# Patient Record
Sex: MALE | Race: WHITE
[De-identification: names, ages, dates, MRNs, and addresses within clinical notes are randomized per-mention and may not be internally consistent; named-entity substitution may affect disease eponyms.]

---

## 2017-03-24 ENCOUNTER — HOSPITAL ENCOUNTER (INPATIENT)
Dept: HOSPITAL 82 - ED | Age: 72
LOS: 3 days | Discharge: HOME | DRG: 871 | End: 2017-03-27
Attending: INTERNAL MEDICINE | Admitting: INTERNAL MEDICINE
Payer: MEDICARE

## 2017-03-24 VITALS — BODY MASS INDEX: 26.93 KG/M2 | WEIGHT: 167.55 LBS | HEIGHT: 66 IN

## 2017-03-24 VITALS — SYSTOLIC BLOOD PRESSURE: 94 MMHG | DIASTOLIC BLOOD PRESSURE: 59 MMHG

## 2017-03-24 VITALS — DIASTOLIC BLOOD PRESSURE: 80 MMHG | SYSTOLIC BLOOD PRESSURE: 129 MMHG

## 2017-03-24 DIAGNOSIS — Z95.5: ICD-10-CM

## 2017-03-24 DIAGNOSIS — J44.0: ICD-10-CM

## 2017-03-24 DIAGNOSIS — A04.7: ICD-10-CM

## 2017-03-24 DIAGNOSIS — J18.9: ICD-10-CM

## 2017-03-24 DIAGNOSIS — Z87.891: ICD-10-CM

## 2017-03-24 DIAGNOSIS — K57.52: ICD-10-CM

## 2017-03-24 DIAGNOSIS — A41.9: Primary | ICD-10-CM

## 2017-03-24 DIAGNOSIS — Z23: ICD-10-CM

## 2017-03-24 DIAGNOSIS — J44.1: ICD-10-CM

## 2017-03-24 DIAGNOSIS — I25.10: ICD-10-CM

## 2017-03-24 LAB
ALBUMIN SERPL-MCNC: 4.1 G/DL (ref 3.2–5)
ALP SERPL-CCNC: 141 U/L (ref 38–126)
ALT SERPL-CCNC: 25 U/L (ref 11–66)
AMYLASE SERPL-CCNC: 41 U/L (ref 30–110)
ANION GAP SERPL CALCULATED.3IONS-SCNC: 19 MMOL/L
AST SERPL-CCNC: 27 U/L (ref 19–48)
BACTERIA #/AREA URNS HPF: (no result) HPF
BASOPHILS NFR BLD AUTO: 0 % (ref 0–3)
BILIRUB UR QL STRIP.AUTO: (no result)
BUN SERPL-MCNC: 20 MG/DL (ref 8–23)
BUN/CREAT SERPL: 17
CALCIUM SERPL-MCNC: 9.6 MG/DL (ref 8.4–10.2)
CHLORIDE SERPL-SCNC: 96 MMOL/L (ref 95–108)
CLARITY UR: (no result)
CO2 SERPL-SCNC: 29 MMOL/L (ref 22–30)
COLOR UR AUTO: YELLOW
CREAT SERPL-MCNC: 1.2 MG/DL (ref 0.7–1.3)
EOSINOPHIL NFR BLD AUTO: 0 % (ref 0–8)
ERYTHROCYTE [DISTWIDTH] IN BLOOD BY AUTOMATED COUNT: 15.2 % (ref 11.5–15.5)
GLUCOSE SERPL-MCNC: 142 MG/DL (ref 82–115)
GLUCOSE UR STRIP.AUTO-MCNC: NEGATIVE MG/DL
HCT VFR BLD AUTO: 48.4 % (ref 39–50)
HGB BLD-MCNC: 15.9 G/DL (ref 14–18)
HGB UR QL STRIP.AUTO: NEGATIVE
HYALINE CASTS URNS QL MICRO: (no result) LPF
IMM GRANULOCYTES NFR BLD: 0.9 % (ref 0–1)
KETONES UR STRIP.AUTO-MCNC: NEGATIVE MG/DL
LEUKOCYTE ESTERASE UR QL STRIP.AUTO: NEGATIVE
LIPASE SERPL-CCNC: 58 U/L (ref 23–300)
LYMPHOCYTES NFR BLD: 5 % (ref 15–41)
MCH RBC QN AUTO: 28.3 PG  CALC (ref 26–32)
MCHC RBC AUTO-ENTMCNC: 32.9 G/L CALC (ref 32–36)
MCV RBC AUTO: 86.3 FL  CALC (ref 80–100)
MONOCYTES NFR BLD AUTO: 5 % (ref 2–13)
NEUTROPHILS # BLD AUTO: 21.6 THOU/UL (ref 1.82–7.42)
NEUTROPHILS NFR BLD AUTO: 88 % (ref 42–76)
NITRITE UR QL STRIP.AUTO: NEGATIVE
PH UR STRIP.AUTO: 5.5 [PH] (ref 4.5–8)
PLATELET # BLD AUTO: 511 THOU/UL (ref 130–400)
POTASSIUM SERPL-SCNC: 4.7 MMOL/L (ref 3.5–5.1)
PROT SERPL-MCNC: 9 G/DL (ref 6.3–8.2)
PROT UR QL STRIP.AUTO: 100 MG/DL
RBC # BLD AUTO: 5.61 MILL/UL (ref 4.7–6.1)
RBC #/AREA URNS HPF: (no result) RBC/HPF (ref 0–5)
SERVICE CMNT 15-IMP: (no result) HPF
SODIUM SERPL-SCNC: 139 MMOL/L (ref 137–146)
SP GR UR STRIP.AUTO: >=1.03
SQUAMOUS URNS QL MICRO: (no result) EPI/HPF
UROBILINOGEN UR QL STRIP.AUTO: 1 E.U./DL
WBC #/AREA URNS HPF: (no result) WBC/HPF (ref 0–5)

## 2017-03-24 NOTE — NUR
PT ARRIVED FROM ER VIA STRETCHER ACCOMPANIED BY STAFF. IV SITE IS FREE FROM
REDNESS OR EDEMA. TELE MONITOR IN PLACE.

## 2017-03-24 NOTE — NUR
PT ARRIVED TO UNIT VIA STRETCHER WITH LAVELLE CONNOR. AMBULATED FROM STRETCHER TO
BED WITH MINIMAL ASSIST. PT C/O ABDOMINAL PAIN AT THIS TIME. NO RESPIRATORY
DISTRESS NOTED. PT ORIENTED TO ROOM. PLAN OF CARE DISCUSSED. ENCOURAGED TO
VERBALIZE CONCERNS. PT STATES UNDERSTANDING. SAFETY MEASURES IN PLACE. CALL
LIGHT SYSTEM REVIEWED AND IN REACH.

## 2017-03-24 NOTE — NUR
PT ARRIVED TO UNIT VIA STRETCHER WITH LAVELLE CONNOR. AMBULATED FROM STRETCHER TO
BED WITH MININAL ASSIST. PT C/O ABDOMINAL PAIN AT THIS TIME. NO RESPIRATORY
DISTRESS NOTED. PT ORIENTED TO ROOM. PLAN OF CARE DISCUSSED. ENCOURAGED TO
VERBALIZE CONCERNS. PT STATES UNDERSTANDING. SAFETY MEASURES IN PLACE. CALL
LIGHT SYSTEM REVIEWED AND IN REACH.

## 2017-03-25 VITALS — DIASTOLIC BLOOD PRESSURE: 66 MMHG | SYSTOLIC BLOOD PRESSURE: 96 MMHG

## 2017-03-25 VITALS — DIASTOLIC BLOOD PRESSURE: 60 MMHG | SYSTOLIC BLOOD PRESSURE: 104 MMHG

## 2017-03-25 VITALS — SYSTOLIC BLOOD PRESSURE: 123 MMHG | DIASTOLIC BLOOD PRESSURE: 53 MMHG

## 2017-03-25 VITALS — DIASTOLIC BLOOD PRESSURE: 54 MMHG | SYSTOLIC BLOOD PRESSURE: 116 MMHG

## 2017-03-25 VITALS — SYSTOLIC BLOOD PRESSURE: 135 MMHG | DIASTOLIC BLOOD PRESSURE: 65 MMHG

## 2017-03-25 LAB
ANION GAP SERPL CALCULATED.3IONS-SCNC: 16 MMOL/L
BASOPHILS NFR BLD AUTO: 0 % (ref 0–3)
BUN SERPL-MCNC: 20 MG/DL (ref 8–23)
BUN/CREAT SERPL: 20
CALCIUM SERPL-MCNC: 9 MG/DL (ref 8.4–10.2)
CHLORIDE SERPL-SCNC: 100 MMOL/L (ref 95–108)
CO2 SERPL-SCNC: 25 MMOL/L (ref 22–30)
CREAT SERPL-MCNC: 1 MG/DL (ref 0.7–1.3)
EOSINOPHIL NFR BLD AUTO: 0 % (ref 0–8)
ERYTHROCYTE [DISTWIDTH] IN BLOOD BY AUTOMATED COUNT: 15.2 % (ref 11.5–15.5)
GLUCOSE SERPL-MCNC: 136 MG/DL (ref 82–115)
HCT VFR BLD AUTO: 41.8 % (ref 39–50)
HGB BLD-MCNC: 13.4 G/DL (ref 14–18)
IMM GRANULOCYTES NFR BLD: 0.5 % (ref 0–1)
LYMPHOCYTES NFR BLD: 2 % (ref 15–41)
MCH RBC QN AUTO: 27.6 PG  CALC (ref 26–32)
MCHC RBC AUTO-ENTMCNC: 32.1 G/L CALC (ref 32–36)
MCV RBC AUTO: 86.2 FL  CALC (ref 80–100)
MONOCYTES NFR BLD AUTO: 3 % (ref 2–13)
NEUTROPHILS # BLD AUTO: 22.35 THOU/UL (ref 1.82–7.42)
NEUTROPHILS NFR BLD AUTO: 94 % (ref 42–76)
PLATELET # BLD AUTO: 358 THOU/UL (ref 130–400)
POTASSIUM SERPL-SCNC: 4.7 MMOL/L (ref 3.5–5.1)
RBC # BLD AUTO: 4.85 MILL/UL (ref 4.7–6.1)
SODIUM SERPL-SCNC: 137 MMOL/L (ref 137–146)

## 2017-03-25 NOTE — NUR
PT ASLEEP AT THIS TIME. NO SIGNS OF DISTRESS NOTED. RESPIRATIONS EVEN AND
UNLABORED. IV FLUID INFUSING ADEQUATELY. IV SITE APPEARS HEALTHY. SAFETY
MEASURES IN PLACE. CALL LIGHT WITHIN REACH.

## 2017-03-25 NOTE — NUR
RESTING IN BED WATCHING TV, A/O X3, RESPIRATIONS EVEN AND UNLABORED ON RA.
C/O ABDONINAL PAIN 8/10 AND MEDICATED WITH LORTAB 7.5MG AT THIS TIME.  NS
INFUSING TO LW AT 100CC/HR.  ENCOURAGED TO USE CALL LIGHT FOR ASSISTANCE, WILL
CONTINUE TO MONITOR.

## 2017-03-25 NOTE — NUR
PTN REPORTS ABDOMINAL PAIN. 7 ON SCALE OF 0-10. ORDERED PAIN MEDICATION
DILAUDID REVIEWED. PT REQUESTING "PAIN PILL". WILL DISCUSS WITH DR. GARCIA.

## 2017-03-26 VITALS — DIASTOLIC BLOOD PRESSURE: 60 MMHG | SYSTOLIC BLOOD PRESSURE: 127 MMHG

## 2017-03-26 VITALS — SYSTOLIC BLOOD PRESSURE: 138 MMHG | DIASTOLIC BLOOD PRESSURE: 66 MMHG

## 2017-03-26 VITALS — DIASTOLIC BLOOD PRESSURE: 77 MMHG | SYSTOLIC BLOOD PRESSURE: 145 MMHG

## 2017-03-26 VITALS — DIASTOLIC BLOOD PRESSURE: 66 MMHG | SYSTOLIC BLOOD PRESSURE: 145 MMHG

## 2017-03-26 VITALS — DIASTOLIC BLOOD PRESSURE: 56 MMHG | SYSTOLIC BLOOD PRESSURE: 136 MMHG

## 2017-03-26 VITALS — SYSTOLIC BLOOD PRESSURE: 162 MMHG | DIASTOLIC BLOOD PRESSURE: 76 MMHG

## 2017-03-26 VITALS — SYSTOLIC BLOOD PRESSURE: 141 MMHG | DIASTOLIC BLOOD PRESSURE: 55 MMHG

## 2017-03-26 LAB
ANION GAP SERPL CALCULATED.3IONS-SCNC: 13 MMOL/L
BASOPHILS NFR BLD AUTO: 0 % (ref 0–3)
BUN SERPL-MCNC: 19 MG/DL (ref 8–23)
BUN/CREAT SERPL: 22
C DIFF TOX A+B STL QL: POSITIVE
CALCIUM SERPL-MCNC: 8.7 MG/DL (ref 8.4–10.2)
CHLORIDE SERPL-SCNC: 103 MMOL/L (ref 95–108)
CO2 SERPL-SCNC: 24 MMOL/L (ref 22–30)
CREAT SERPL-MCNC: 0.9 MG/DL (ref 0.7–1.3)
EOSINOPHIL NFR BLD AUTO: 0 % (ref 0–8)
ERYTHROCYTE [DISTWIDTH] IN BLOOD BY AUTOMATED COUNT: 15.1 % (ref 11.5–15.5)
GLUCOSE SERPL-MCNC: 130 MG/DL (ref 82–115)
HCT VFR BLD AUTO: 37.4 % (ref 39–50)
HGB BLD-MCNC: 12.2 G/DL (ref 14–18)
IMM GRANULOCYTES NFR BLD: 0.8 % (ref 0–1)
LYMPHOCYTES NFR BLD: 2 % (ref 15–41)
MCH RBC QN AUTO: 28 PG  CALC (ref 26–32)
MCHC RBC AUTO-ENTMCNC: 32.6 G/L CALC (ref 32–36)
MCV RBC AUTO: 85.8 FL  CALC (ref 80–100)
MONOCYTES NFR BLD AUTO: 2 % (ref 2–13)
NEUTROPHILS # BLD AUTO: 24.97 THOU/UL (ref 1.82–7.42)
NEUTROPHILS NFR BLD AUTO: 96 % (ref 42–76)
PLATELET # BLD AUTO: 329 THOU/UL (ref 130–400)
POTASSIUM SERPL-SCNC: 4.5 MMOL/L (ref 3.5–5.1)
RBC # BLD AUTO: 4.36 MILL/UL (ref 4.7–6.1)
SODIUM SERPL-SCNC: 136 MMOL/L (ref 137–146)

## 2017-03-26 PROCEDURE — 3E0234Z INTRODUCTION OF SERUM, TOXOID AND VACCINE INTO MUSCLE, PERCUTANEOUS APPROACH: ICD-10-PCS | Performed by: INTERNAL MEDICINE

## 2017-03-26 NOTE — NUR
REPORT RECEIVED FROM SEPIDEH MOTA; PT.UPRIGHT IN BED WATCHING TV; REQUESTED
PO FLUIDS REPLENISHED AND DENIES ANY FURTHER NEEDS AT THIS TIME

## 2017-03-26 NOTE — NUR
RESTING IN SEMIFOWLERS DENIES ABDOMINAL PAIN AT THIS TIME.  IV FLUIDS INFUSING
TO LW WITH NO COMPLICATIONS.  CALL LIGHT IN REACH.

## 2017-03-26 NOTE — NUR
PT.AMBULATED TO RESTROOM W/ASSISTANCE; PT.HAD LOOSE,BROWN STOOL
AND URINE IN TOILET AND ASSISTED BACK TO BED; PT.VERY WEAK UPON AMBULATING;
SISTER AT BEDSIDE.  PT.PROVIDED PO WATER TO DRINK AND REPOSITIONED;
PT.REMINDED TO CALL FOR ASSISTANCE

## 2017-03-27 VITALS — DIASTOLIC BLOOD PRESSURE: 75 MMHG | SYSTOLIC BLOOD PRESSURE: 162 MMHG

## 2017-03-27 VITALS — SYSTOLIC BLOOD PRESSURE: 144 MMHG | DIASTOLIC BLOOD PRESSURE: 70 MMHG

## 2017-03-27 LAB
ANION GAP SERPL CALCULATED.3IONS-SCNC: 13 MMOL/L
BASOPHILS NFR BLD AUTO: 0 % (ref 0–3)
BUN SERPL-MCNC: 18 MG/DL (ref 8–23)
BUN/CREAT SERPL: 20
CALCIUM SERPL-MCNC: 8.4 MG/DL (ref 8.4–10.2)
CHLORIDE SERPL-SCNC: 106 MMOL/L (ref 95–108)
CO2 SERPL-SCNC: 24 MMOL/L (ref 22–30)
CREAT SERPL-MCNC: 0.9 MG/DL (ref 0.7–1.3)
EOSINOPHIL NFR BLD AUTO: 0 % (ref 0–8)
ERYTHROCYTE [DISTWIDTH] IN BLOOD BY AUTOMATED COUNT: 15.3 % (ref 11.5–15.5)
GLUCOSE SERPL-MCNC: 116 MG/DL (ref 82–115)
HCT VFR BLD AUTO: 35.4 % (ref 39–50)
HGB BLD-MCNC: 11.7 G/DL (ref 14–18)
IMM GRANULOCYTES NFR BLD: 1.3 % (ref 0–1)
LYMPHOCYTES NFR BLD: 4 % (ref 15–41)
MCH RBC QN AUTO: 28 PG  CALC (ref 26–32)
MCHC RBC AUTO-ENTMCNC: 33.1 G/L CALC (ref 32–36)
MCV RBC AUTO: 84.7 FL  CALC (ref 80–100)
MONOCYTES NFR BLD AUTO: 3 % (ref 2–13)
NEUTROPHILS # BLD AUTO: 14.54 THOU/UL (ref 1.82–7.42)
NEUTROPHILS NFR BLD AUTO: 92 % (ref 42–76)
PLATELET # BLD AUTO: 330 THOU/UL (ref 130–400)
POTASSIUM SERPL-SCNC: 4.2 MMOL/L (ref 3.5–5.1)
RBC # BLD AUTO: 4.18 MILL/UL (ref 4.7–6.1)
SODIUM SERPL-SCNC: 139 MMOL/L (ref 137–146)

## 2017-03-27 NOTE — NUR
ASSESSMENT IS COMPLETED: IV SITE IS FREE FROM REDNESS OR EDEMA. EXPLAINED TO
PT RE: TEST OF THE STOOL COMING BACK AS POSITIVE FOR C-DIFF. AND BEING PLACED
ON CONTACT PRECAUTIONS. TELE MONITOR IN PLACE.

## 2017-03-27 NOTE — NUR
UPON GIVING REPORT LABS/RESULTS WERE REVIEWED AND IT WAS DISCOVERED THAT A
STOOL SAMPLE RESULTED POS.FOR C-DIFF @ 2215, BUT NEVER NOTIFIED BY LAB OF
POSITIVE RESULTS; CALLED LAB THIS AM TO QUESTION AS TO WHY THE NURSE WAS NOT
NOTIFIED AND IT WAS REPORTED THEY FORGOT.

## 2019-02-14 ENCOUNTER — HOSPITAL ENCOUNTER (OUTPATIENT)
Dept: HOSPITAL 82 - RT | Age: 74
Discharge: HOME | End: 2019-02-14
Attending: INTERNAL MEDICINE
Payer: MEDICARE

## 2019-02-14 DIAGNOSIS — R06.02: Primary | ICD-10-CM

## 2020-02-29 ENCOUNTER — HOSPITAL ENCOUNTER (EMERGENCY)
Age: 75
LOS: 1 days | Discharge: HOME | End: 2020-03-01
Payer: MEDICARE

## 2020-02-29 DIAGNOSIS — J20.9: ICD-10-CM

## 2020-02-29 DIAGNOSIS — J43.9: Primary | ICD-10-CM

## 2020-02-29 DIAGNOSIS — R07.89: ICD-10-CM

## 2020-02-29 DIAGNOSIS — R91.8: ICD-10-CM

## 2020-02-29 DIAGNOSIS — I25.10: ICD-10-CM

## 2020-02-29 LAB
ALBUMIN SERPL-MCNC: 4.3 G/DL (ref 3.2–5)
ALP SERPL-CCNC: 118 U/L (ref 38–126)
AMYLASE SERPL-CCNC: 58 U/L (ref 30–110)
ANION GAP SERPL CALCULATED.3IONS-SCNC: 13 MMOL/L
APTT PPP: 29.6 SECONDS (ref 20–32.5)
AST SERPL-CCNC: 38 U/L (ref 19–48)
BASOPHILS NFR BLD AUTO: 0 % (ref 0–3)
BUN SERPL-MCNC: 18 MG/DL (ref 8–23)
BUN/CREAT SERPL: 17
CHLORIDE SERPL-SCNC: 99 MMOL/L (ref 95–108)
CO2 SERPL-SCNC: 29 MMOL/L (ref 22–30)
CREAT SERPL-MCNC: 1.1 MG/DL (ref 0.7–1.3)
D DIMER PPP FEU-MCNC: 2.9 MG/L (ref 0.19–0.6)
EOSINOPHIL NFR BLD AUTO: 2 % (ref 0–8)
ERYTHROCYTE [DISTWIDTH] IN BLOOD BY AUTOMATED COUNT: 16 % (ref 11.5–15.5)
HCT VFR BLD AUTO: 43.6 % (ref 39–50)
HGB BLD-MCNC: 13.9 G/DL (ref 14–18)
IMM GRANULOCYTES NFR BLD: 0.5 % (ref 0–5)
INR PPP: 1 RATIO (ref 0.7–1.3)
LIPASE SERPL-CCNC: 84 U/L (ref 23–300)
LYMPHOCYTES NFR BLD: 8 % (ref 15–41)
MCH RBC QN AUTO: 27.3 PG  CALC (ref 26–32)
MCHC RBC AUTO-ENTMCNC: 31.9 G/L CALC (ref 32–36)
MCV RBC AUTO: 85.5 FL  CALC (ref 80–100)
MONOCYTES NFR BLD AUTO: 6 % (ref 2–13)
MYOGLOBIN SERPL-MCNC: 29 NG/ML (ref 0–121)
NEUTROPHILS # BLD AUTO: 11.15 THOU/UL (ref 1.82–7.42)
NEUTROPHILS NFR BLD AUTO: 84 % (ref 42–76)
PLATELET # BLD AUTO: 412 THOU/UL (ref 130–400)
POTASSIUM SERPL-SCNC: 5.4 MMOL/L (ref 3.5–5.1)
PROT SERPL-MCNC: 8.9 G/DL (ref 6.3–8.2)
PROTHROMBIN TIME: 10.9 SECONDS (ref 9–12.5)
RBC # BLD AUTO: 5.1 MILL/UL (ref 4.7–6.1)
SODIUM SERPL-SCNC: 136 MMOL/L (ref 137–146)

## 2020-03-07 ENCOUNTER — HOSPITAL ENCOUNTER (EMERGENCY)
Age: 75
LOS: 1 days | Discharge: HOME | End: 2020-03-08
Payer: MEDICARE

## 2020-03-07 DIAGNOSIS — Z95.5: ICD-10-CM

## 2020-03-07 DIAGNOSIS — J43.9: ICD-10-CM

## 2020-03-07 DIAGNOSIS — C79.9: ICD-10-CM

## 2020-03-07 DIAGNOSIS — C80.1: ICD-10-CM

## 2020-03-07 DIAGNOSIS — I25.10: ICD-10-CM

## 2020-03-07 DIAGNOSIS — K59.00: Primary | ICD-10-CM

## 2020-03-07 LAB
ALBUMIN SERPL-MCNC: 3.8 G/DL (ref 3.2–5)
ALP SERPL-CCNC: 126 U/L (ref 38–126)
ANION GAP SERPL CALCULATED.3IONS-SCNC: 13 MMOL/L
AST SERPL-CCNC: 44 U/L (ref 19–48)
BASOPHILS NFR BLD AUTO: 0 % (ref 0–3)
BUN SERPL-MCNC: 27 MG/DL (ref 8–23)
BUN/CREAT SERPL: 29
CHLORIDE SERPL-SCNC: 98 MMOL/L (ref 95–108)
CO2 SERPL-SCNC: 28 MMOL/L (ref 22–30)
CREAT SERPL-MCNC: 0.9 MG/DL (ref 0.7–1.3)
EOSINOPHIL NFR BLD AUTO: 0 % (ref 0–8)
ERYTHROCYTE [DISTWIDTH] IN BLOOD BY AUTOMATED COUNT: 17 % (ref 11.5–15.5)
HCT VFR BLD AUTO: 48 % (ref 39–50)
HGB BLD-MCNC: 14.9 G/DL (ref 14–18)
IMM GRANULOCYTES NFR BLD: 1.2 % (ref 0–5)
LYMPHOCYTES NFR BLD: 5 % (ref 15–41)
MCH RBC QN AUTO: 27 PG  CALC (ref 26–32)
MCHC RBC AUTO-ENTMCNC: 31 G/L CALC (ref 32–36)
MCV RBC AUTO: 87.1 FL  CALC (ref 80–100)
MONOCYTES NFR BLD AUTO: 6 % (ref 2–13)
NEUTROPHILS # BLD AUTO: 12.93 THOU/UL (ref 1.82–7.42)
NEUTROPHILS NFR BLD AUTO: 88 % (ref 42–76)
PLATELET # BLD AUTO: 442 THOU/UL (ref 130–400)
POTASSIUM SERPL-SCNC: 4.4 MMOL/L (ref 3.5–5.1)
PROT SERPL-MCNC: 7.6 G/DL (ref 6.3–8.2)
RBC # BLD AUTO: 5.51 MILL/UL (ref 4.7–6.1)
SODIUM SERPL-SCNC: 135 MMOL/L (ref 137–146)

## 2020-03-08 LAB
BILIRUB UR QL STRIP.AUTO: NEGATIVE
COLOR UR AUTO: YELLOW
GLUCOSE UR STRIP.AUTO-MCNC: NEGATIVE MG/DL
HGB UR QL STRIP.AUTO: (no result)
KETONES UR STRIP.AUTO-MCNC: 15 MG/DL
LEUKOCYTE ESTERASE UR QL STRIP.AUTO: NEGATIVE
NITRITE UR QL STRIP.AUTO: NEGATIVE
PH UR STRIP.AUTO: 5.5 [PH] (ref 4.5–8)
PROT UR QL STRIP.AUTO: NEGATIVE MG/DL
SP GR UR STRIP.AUTO: >=1.03
UROBILINOGEN UR QL STRIP.AUTO: 0.2 E.U./DL

## 2020-04-16 VITALS — SYSTOLIC BLOOD PRESSURE: 168 MMHG | DIASTOLIC BLOOD PRESSURE: 80 MMHG

## 2020-04-16 VITALS — DIASTOLIC BLOOD PRESSURE: 73 MMHG | SYSTOLIC BLOOD PRESSURE: 159 MMHG

## 2020-04-16 LAB
ALBUMIN SERPL-MCNC: 2.7 G/DL (ref 3.2–5)
ALP SERPL-CCNC: 76 U/L (ref 38–126)
ANION GAP SERPL CALCULATED.3IONS-SCNC: 11 MMOL/L
AST SERPL-CCNC: 33 U/L (ref 19–48)
BILIRUB UR QL STRIP.AUTO: NEGATIVE
BUN SERPL-MCNC: 18 MG/DL (ref 8–23)
BUN/CREAT SERPL: 44
CHLORIDE SERPL-SCNC: 94 MMOL/L (ref 95–108)
CO2 SERPL-SCNC: 28 MMOL/L (ref 22–30)
COLOR UR AUTO: (no result)
CREAT SERPL-MCNC: 0.4 MG/DL (ref 0.7–1.3)
ERYTHROCYTE [DISTWIDTH] IN BLOOD BY AUTOMATED COUNT: 18.4 % (ref 11.5–15.5)
GLUCOSE UR STRIP.AUTO-MCNC: 100 MG/DL
HCT VFR BLD AUTO: 43.8 % (ref 39–50)
HGB BLD-MCNC: 14.5 G/DL (ref 14–18)
HGB UR QL STRIP.AUTO: NEGATIVE
IMM GRANULOCYTES NFR BLD: 48.6 % (ref 0–5)
KETONES UR STRIP.AUTO-MCNC: 15 MG/DL
LEUKOCYTE ESTERASE UR QL STRIP.AUTO: NEGATIVE
LYMPHOCYTES NFR BLD MANUAL: 7 % (ref 15–41)
MANUAL DIFF BLD: YES
MCH RBC QN AUTO: 27.9 PG  CALC (ref 26–32)
MCHC RBC AUTO-ENTMCNC: 33.1 G/DL CAL (ref 32–36)
MCV RBC AUTO: 84.4 FL  CALC (ref 80–100)
MONOCYTES NFR BLD MANUAL: 7 % (ref 2–13)
NEUTS BAND NFR BLD MANUAL: 6 % (ref 0–8)
NEUTS SEG NFR BLD MANUAL: 80 % (ref 42–76)
NITRITE UR QL STRIP.AUTO: NEGATIVE
PH UR STRIP.AUTO: 8 [PH] (ref 4.5–8)
PLATELET # BLD AUTO: 43 THOU/UL (ref 130–400)
POTASSIUM SERPL-SCNC: 5 MMOL/L (ref 3.5–5.1)
PROT SERPL-MCNC: 5.6 G/DL (ref 6.3–8.2)
PROT UR QL STRIP.AUTO: 30 MG/DL
RBC # BLD AUTO: 5.19 MILL/UL (ref 4.7–6.1)
RBC #/AREA URNS HPF: (no result) RBC/HPF (ref 0–5)
SODIUM SERPL-SCNC: 128 MMOL/L (ref 137–146)
SP GR UR STRIP.AUTO: 1.01
UROBILINOGEN UR QL STRIP.AUTO: 4 E.U./DL
WBC #/AREA URNS HPF: (no result) WBC/HPF (ref 0–5)

## 2020-04-16 NOTE — NUR
PT TRANSPORTED TO MED SURG VIA STRETCHER STABLE AND IN NO DISTRESS. CARE
ASSUMED TO STEFANY.
 
                                        Admission Note
 
Report Given to:         
Transported by:           Wheelchair          X Stretcher
 
Transported with:        X Nurse     Transporter   X Patent IV   X O2
                         X Cardiac Monitor
 
Location:                 ICU      X MS2

## 2020-04-16 NOTE — NUR
PT PLACED ON O2 AGAIN WHEN O2 SAT WAS AT 92% CONSTANT. PT DENIES ANY NEEDS AT
THIS TIME AND CALL LIGHT WITHIN REACH

## 2020-04-16 NOTE — NUR
PHYSICAL ASSEMENT COMPLETE. VS TAKEN BY CNA @ 1935 ASSESSED.
PLAN OF CARE REVIEWED W/ PT, PT VERBALIZES UNDERSTANDING AND DENIES QUESTIONS
@ THIS TIME. PT DENIES NEEDS @ THIS TIME. CALL BELL WITHIN REACH, AGREES TO
CALL PRN. BED LOCKED IN LOW POSITION W/ BEDRAILS UPX2. ITEMS WITHIN REACH.

## 2020-04-16 NOTE — NUR
ASSESSMENT IS COMPLETED: IV SITE IS FREE FROM REDNESS OR EDEMA. HR IS
REG,PULSES ARE STRONG X4, ABD IS SOFT WITH ACTIVE BS. BREATH SOUNDS ARE
COARSE, WHEEZING AND RHONCI. O2 @ 2LITERS WITH NC. INFORMED PT RE: NPO STATUS
DUE TO UNABLE TO SWALLOW EASILY. VERBALIZED UNDERSTANDING. CONTINUE TO OBSERVE
AND MONITOR. TELE MONITOR IN PLACE.

## 2020-04-16 NOTE — NUR
DAUGHTER CALLED FOR UPDATE. PT GAVE PERMISSION. SHE VERBALIZED UNDERSTANDING
WITH MAUREEN BUSTAMANTE AND DENIES HAVING ANY OTHER QUESTIONS.

## 2020-04-16 NOTE — NUR
PT ARRIVED BY EMS WITH SOB. LUNGS SOUND WHEEZY, AND DEMINISHED IN THE LOWER
LOBES. 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 LAST CHEMO WAS A WEEK AGO. PT HAS
A PRODUCTIVE COUGH, HE STATES HAVING IT FOR A LONG TIME. HE DENIES ANY PAIN OR
OTHER SYMTOMS. PT ARRIVED WITH NON DAMIR MASK BECAUSE EMS STATES A O2 SAT OF 91%.
RESPIRATORY TOOK OFF O2 FOR ABG. WILL CONTINUE TO [facility].

## 2020-04-17 ENCOUNTER — HOSPITAL ENCOUNTER (INPATIENT)
Age: 75
LOS: 2 days | Discharge: NURSING, REHAB OR LONG-TERM CARE | DRG: 809 | End: 2020-04-19
Admitting: INTERNAL MEDICINE
Payer: MEDICARE

## 2020-04-17 VITALS — SYSTOLIC BLOOD PRESSURE: 138 MMHG | DIASTOLIC BLOOD PRESSURE: 68 MMHG

## 2020-04-17 VITALS — SYSTOLIC BLOOD PRESSURE: 114 MMHG | DIASTOLIC BLOOD PRESSURE: 64 MMHG

## 2020-04-17 VITALS — DIASTOLIC BLOOD PRESSURE: 77 MMHG | SYSTOLIC BLOOD PRESSURE: 180 MMHG

## 2020-04-17 VITALS — SYSTOLIC BLOOD PRESSURE: 187 MMHG | DIASTOLIC BLOOD PRESSURE: 91 MMHG

## 2020-04-17 VITALS — SYSTOLIC BLOOD PRESSURE: 114 MMHG | DIASTOLIC BLOOD PRESSURE: 63 MMHG

## 2020-04-17 VITALS — DIASTOLIC BLOOD PRESSURE: 64 MMHG | SYSTOLIC BLOOD PRESSURE: 114 MMHG

## 2020-04-17 VITALS — DIASTOLIC BLOOD PRESSURE: 67 MMHG | SYSTOLIC BLOOD PRESSURE: 138 MMHG

## 2020-04-17 VITALS — SYSTOLIC BLOOD PRESSURE: 149 MMHG | DIASTOLIC BLOOD PRESSURE: 65 MMHG

## 2020-04-17 VITALS — SYSTOLIC BLOOD PRESSURE: 119 MMHG | DIASTOLIC BLOOD PRESSURE: 59 MMHG

## 2020-04-17 VITALS — DIASTOLIC BLOOD PRESSURE: 78 MMHG | SYSTOLIC BLOOD PRESSURE: 179 MMHG

## 2020-04-17 VITALS — DIASTOLIC BLOOD PRESSURE: 63 MMHG | SYSTOLIC BLOOD PRESSURE: 121 MMHG

## 2020-04-17 VITALS — DIASTOLIC BLOOD PRESSURE: 66 MMHG | SYSTOLIC BLOOD PRESSURE: 149 MMHG

## 2020-04-17 VITALS — DIASTOLIC BLOOD PRESSURE: 77 MMHG | SYSTOLIC BLOOD PRESSURE: 171 MMHG

## 2020-04-17 VITALS — DIASTOLIC BLOOD PRESSURE: 75 MMHG | SYSTOLIC BLOOD PRESSURE: 168 MMHG

## 2020-04-17 VITALS — SYSTOLIC BLOOD PRESSURE: 129 MMHG | DIASTOLIC BLOOD PRESSURE: 72 MMHG

## 2020-04-17 VITALS — DIASTOLIC BLOOD PRESSURE: 68 MMHG | SYSTOLIC BLOOD PRESSURE: 138 MMHG

## 2020-04-17 VITALS — SYSTOLIC BLOOD PRESSURE: 146 MMHG | DIASTOLIC BLOOD PRESSURE: 71 MMHG

## 2020-04-17 VITALS — SYSTOLIC BLOOD PRESSURE: 137 MMHG | DIASTOLIC BLOOD PRESSURE: 69 MMHG

## 2020-04-17 DIAGNOSIS — C79.51: ICD-10-CM

## 2020-04-17 DIAGNOSIS — R78.81: ICD-10-CM

## 2020-04-17 DIAGNOSIS — E87.6: ICD-10-CM

## 2020-04-17 DIAGNOSIS — Z95.5: ICD-10-CM

## 2020-04-17 DIAGNOSIS — Z66: ICD-10-CM

## 2020-04-17 DIAGNOSIS — D69.59: ICD-10-CM

## 2020-04-17 DIAGNOSIS — C34.90: ICD-10-CM

## 2020-04-17 DIAGNOSIS — E78.49: ICD-10-CM

## 2020-04-17 DIAGNOSIS — E86.0: ICD-10-CM

## 2020-04-17 DIAGNOSIS — D70.1: Primary | ICD-10-CM

## 2020-04-17 DIAGNOSIS — I25.10: ICD-10-CM

## 2020-04-17 DIAGNOSIS — Z79.899: ICD-10-CM

## 2020-04-17 DIAGNOSIS — I10: ICD-10-CM

## 2020-04-17 DIAGNOSIS — Z12.5: ICD-10-CM

## 2020-04-17 DIAGNOSIS — Z87.891: ICD-10-CM

## 2020-04-17 DIAGNOSIS — T45.1X5A: ICD-10-CM

## 2020-04-17 DIAGNOSIS — Z92.3: ICD-10-CM

## 2020-04-17 DIAGNOSIS — R13.10: ICD-10-CM

## 2020-04-17 DIAGNOSIS — Z20.828: ICD-10-CM

## 2020-04-17 DIAGNOSIS — J43.9: ICD-10-CM

## 2020-04-17 LAB
APTT PPP: 27.6 SECONDS (ref 20–32.5)
BASOPHILS NFR BLD AUTO: 13 % (ref 0–3)
EOSINOPHIL NFR BLD AUTO: 0 % (ref 0–8)
ERYTHROCYTE [DISTWIDTH] IN BLOOD BY AUTOMATED COUNT: 17.7 % (ref 11.5–15.5)
HCT VFR BLD AUTO: 39.3 % (ref 39–50)
HGB BLD-MCNC: 12.7 G/DL (ref 14–18)
IMM GRANULOCYTES NFR BLD: 0 % (ref 0–5)
INR PPP: 1.2 RATIO (ref 0.7–1.3)
LYMPHOCYTES NFR BLD: 0 % (ref 15–41)
MCH RBC QN AUTO: 27.5 PG  CALC (ref 26–32)
MCHC RBC AUTO-ENTMCNC: 32.3 G/DL CAL (ref 32–36)
MCV RBC AUTO: 85.1 FL  CALC (ref 80–100)
MONOCYTES NFR BLD AUTO: 0 % (ref 2–13)
NEUTROPHILS # BLD AUTO: 0.13 THOU/UL (ref 1.82–7.42)
NEUTROPHILS NFR BLD AUTO: 87 % (ref 42–76)
PLATELET # BLD AUTO: 22 THOU/UL (ref 130–400)
PROTHROMBIN TIME: 12 SECONDS (ref 9–12.5)
RBC # BLD AUTO: 4.62 MILL/UL (ref 4.7–6.1)

## 2020-04-17 PROCEDURE — 30233R1 TRANSFUSION OF NONAUTOLOGOUS PLATELETS INTO PERIPHERAL VEIN, PERCUTANEOUS APPROACH: ICD-10-PCS | Performed by: INTERNAL MEDICINE

## 2020-04-17 PROCEDURE — P9034 PLATELETS, PHERESIS: HCPCS

## 2020-04-17 PROCEDURE — G0378 HOSPITAL OBSERVATION PER HR: HCPCS

## 2020-04-17 NOTE — NUR
daughter sylvester phoned for update. update provided. connected daughter to
cordless phone. daughter talked with patient.

## 2020-04-17 NOTE — NUR
F/U NIBP 187/91mmHg, PT REMAINS ASYMPTOMATIC AND W/O DISTRESS OR DISCOMFORT.
WILL REPORT TO MD AND ONCOMING NURSE.

## 2020-04-17 NOTE — NUR
PT ADMITTED FROM MED SURG, REPORT RECEIVED FROM ALDEN MCCALL /67 RR34, O2
SATS 98 ON 2LT OXYGEN.PT ALERT AND ORIENTED X3 TEMP OF 99 TEMPORAL. CIPRO ABX
INFUSED, IVF AT 125ML/HR RUNNING. PT LUNG FERNANDEZ WHEEZY/RHONCHOROUS
BILAT UPPER AND LOWER. STRONG PRODUCTIVE COUGH. PT STATES HE IS IN PAIN.
TREATING AS APPROPRIATE.

## 2020-04-17 NOTE — NUR
PT APPEARS TO BE SLEEPING COMFORTABLY, NO APPARENT DISTRESS. RESPIRATIONS
REGULAR AND UNLABORED. CALL CARRIZALES REMAINS WITHIN REACH. BED REMAINS LOCKED IN
LOW POSITION W/ BEDRAILS UP X2. ITEMS REMAIN WITHIN REACH.

## 2020-04-17 NOTE — NUR
ASSESSMENT IS COMPLETRED: IV SITE IS FREE FROM REDNESS OR EDEMA. HR IS
REG,PULSES ARE STRONG X4, ABD IS SOFT WITH ACTIVE BS. BREATH SOUNDS ARE
WHEEZING , TELE MONITOR IN PLACE.

## 2020-04-17 NOTE — NUR
VS TAKEN BY CNA @ 7630 MAKAYLA. NIBP 180/77mmHg. PT IS IN NO DISTRESS OR
DISCOMFORT. WILL CON'T TO MONITOR.

## 2020-04-17 NOTE — NUR
Preliminary blood culture results called to . 4/4 vials growing gram
(-) rods. New rbvo to start Cefepime 2 grams BID entered.

## 2020-04-17 NOTE — NUR
PT HAS BEEN RELAXING IN BED WITH NO DISTRESS NOTED. IV SITE IS FREE FROM
REDNESS OR EDEMA. CONTINUE TO OBSERVE AND MONITOR.

## 2020-04-17 NOTE — NUR
TRANSFER PT TO ICU BED 8. IV SITE INTACT. AND O2 IN PLACE. BEDSIDE REPORT
COMPLETED AS WELL AS ON THE PHONE.

## 2020-04-17 NOTE — NUR
PT ALERT AND ORIENTED X3,
TACHYPNIC AND TACHYCARDIC. PT ON 2LT OF OXYGEN, SATS OF 97% ON MONITOR.
REPOSITIONED IN BED, PLATELETS ORDERED FROM BLOOD BANK STAT DUE TO
NEUTROPENIA. DR GARCIA CALLED FOR PAIN MEDICATION, LORTAB ORDERED.

## 2020-04-17 NOTE — NUR
REPORTED WBC OF 0.2 AND ELEVATED B/P TO DR. ROSARIO, ORDER RECEIVED FOR
HYDRALAZINE 10MG IV X1 NOW. PT HAS ALREADY RECEIVED NEUPOGEN, SEE MAR.
NEUTROPENIC PRECAUTIONS IN PLACE.

## 2020-04-17 NOTE — NUR
PT AWAKE AND ALERT, SA02 97% ON 2L 02 NC.  NO C/O SOB.  LUNGS COARSE WITH
WHEEZE.  #20 IV STARTED TO R AC. NO NEEDS AT THIS TIME.  CALL BELL IN REACH.

## 2020-04-17 NOTE — NUR
SPOKE WITH GAVIOTA PT'S DAUGHTER/. INQUIRED ON HOW PT IS DOING. IF ANYTHING.
CAN BE DONE WITH HIS BREATHING EXPLAINED ABOUT THE CONSULT WITH DR CROWE/.

## 2020-04-18 VITALS — SYSTOLIC BLOOD PRESSURE: 114 MMHG | DIASTOLIC BLOOD PRESSURE: 66 MMHG

## 2020-04-18 VITALS — DIASTOLIC BLOOD PRESSURE: 64 MMHG | SYSTOLIC BLOOD PRESSURE: 147 MMHG

## 2020-04-18 VITALS — DIASTOLIC BLOOD PRESSURE: 70 MMHG | SYSTOLIC BLOOD PRESSURE: 139 MMHG

## 2020-04-18 VITALS — DIASTOLIC BLOOD PRESSURE: 69 MMHG | SYSTOLIC BLOOD PRESSURE: 123 MMHG

## 2020-04-18 VITALS — DIASTOLIC BLOOD PRESSURE: 84 MMHG | SYSTOLIC BLOOD PRESSURE: 113 MMHG

## 2020-04-18 VITALS — SYSTOLIC BLOOD PRESSURE: 107 MMHG | DIASTOLIC BLOOD PRESSURE: 62 MMHG

## 2020-04-18 VITALS — DIASTOLIC BLOOD PRESSURE: 67 MMHG | SYSTOLIC BLOOD PRESSURE: 131 MMHG

## 2020-04-18 VITALS — SYSTOLIC BLOOD PRESSURE: 128 MMHG | DIASTOLIC BLOOD PRESSURE: 78 MMHG

## 2020-04-18 VITALS — SYSTOLIC BLOOD PRESSURE: 153 MMHG | DIASTOLIC BLOOD PRESSURE: 73 MMHG

## 2020-04-18 VITALS — DIASTOLIC BLOOD PRESSURE: 64 MMHG | SYSTOLIC BLOOD PRESSURE: 131 MMHG

## 2020-04-18 VITALS — DIASTOLIC BLOOD PRESSURE: 80 MMHG | SYSTOLIC BLOOD PRESSURE: 142 MMHG

## 2020-04-18 VITALS — DIASTOLIC BLOOD PRESSURE: 69 MMHG | SYSTOLIC BLOOD PRESSURE: 121 MMHG

## 2020-04-18 VITALS — DIASTOLIC BLOOD PRESSURE: 63 MMHG | SYSTOLIC BLOOD PRESSURE: 118 MMHG

## 2020-04-18 VITALS — DIASTOLIC BLOOD PRESSURE: 60 MMHG | SYSTOLIC BLOOD PRESSURE: 113 MMHG

## 2020-04-18 VITALS — SYSTOLIC BLOOD PRESSURE: 140 MMHG | DIASTOLIC BLOOD PRESSURE: 68 MMHG

## 2020-04-18 VITALS — DIASTOLIC BLOOD PRESSURE: 83 MMHG | SYSTOLIC BLOOD PRESSURE: 122 MMHG

## 2020-04-18 VITALS — SYSTOLIC BLOOD PRESSURE: 123 MMHG | DIASTOLIC BLOOD PRESSURE: 69 MMHG

## 2020-04-18 VITALS — SYSTOLIC BLOOD PRESSURE: 139 MMHG | DIASTOLIC BLOOD PRESSURE: 70 MMHG

## 2020-04-18 VITALS — DIASTOLIC BLOOD PRESSURE: 65 MMHG | SYSTOLIC BLOOD PRESSURE: 125 MMHG

## 2020-04-18 LAB
ALBUMIN SERPL-MCNC: 2.1 G/DL (ref 3.2–5)
ALP SERPL-CCNC: 68 U/L (ref 38–126)
ANION GAP SERPL CALCULATED.3IONS-SCNC: 10 MMOL/L
AST SERPL-CCNC: 28 U/L (ref 19–48)
BUN SERPL-MCNC: 12 MG/DL (ref 8–23)
BUN/CREAT SERPL: 28
CHLORIDE SERPL-SCNC: 100 MMOL/L (ref 95–108)
CO2 SERPL-SCNC: 24 MMOL/L (ref 22–30)
CREAT SERPL-MCNC: 0.4 MG/DL (ref 0.7–1.3)
ERYTHROCYTE [DISTWIDTH] IN BLOOD BY AUTOMATED COUNT: 17.6 % (ref 11.5–15.5)
HCT VFR BLD AUTO: 35.8 % (ref 39–50)
HGB BLD-MCNC: 11.6 G/DL (ref 14–18)
MCH RBC QN AUTO: 27.8 PG  CALC (ref 26–32)
MCHC RBC AUTO-ENTMCNC: 32.4 G/DL CAL (ref 32–36)
MCV RBC AUTO: 85.6 FL  CALC (ref 80–100)
PLATELET # BLD AUTO: 19 THOU/UL (ref 130–400)
POTASSIUM SERPL-SCNC: 3.2 MMOL/L (ref 3.5–5.1)
PROT SERPL-MCNC: 4.3 G/DL (ref 6.3–8.2)
RBC # BLD AUTO: 4.18 MILL/UL (ref 4.7–6.1)
SODIUM SERPL-SCNC: 131 MMOL/L (ref 137–146)

## 2020-04-18 PROCEDURE — 30233R1 TRANSFUSION OF NONAUTOLOGOUS PLATELETS INTO PERIPHERAL VEIN, PERCUTANEOUS APPROACH: ICD-10-PCS | Performed by: INTERNAL MEDICINE

## 2020-04-18 NOTE — NUR
STARTED PLATELET TRANSFUSION. CHANGED PT OF LARGE DAVE COLORED LOOSE BM.
BARRIER CREAM APPLIED TO PINK BUTTOCKS. REQUESTED HOUSE SUP ORDER AIR MATTRESS
BC PT DOES NOT LIKE TO BE TURNED- HE STATES IT INHIBITS HIS BREATHING; PILLOWS
PLACED UNDER KNEES & ANKLES PER PT REQUEST.

## 2020-04-18 NOTE — NUR
SPOKE WITH PTS DAUGHTER GAVIOTA VARGAS ABOUT PTS STATUS. DAUGHTER WOULD LIKE
TO BE ABLE TO SEE PT; STATING SHE HAS A REAL BAD FEELING ABOUT THIS ADMISSION.

## 2020-04-18 NOTE — NUR
DAUGHTER ON UNIT, OUTSIDE PTS DOOR WITH MASK ON. PTS BED MOVED UP AGAINST
GLASS TO BE ABLE TO INTERACT WITH PT. DAUGHTER TAPED A NOTE TO PTS DOOR
STATING "I LOVE YOU" WITH A FLAP TAPED UP THAT SAYS "ITS TIME TO LET GO".
DAUGHTER SIGNED DNR.

## 2020-04-18 NOTE — NUR
PT SLEEPING. TV ON HIGH SO PT CAN HEAR IT. BREATHING MILDLY LABORED, EVEN. 96%
ON 4L NC. CALLBELL W/IN REACH. WILL CONTINUE TO MONITOR.

## 2020-04-18 NOTE — NUR
DAUGHTER NOTIFIED OF ONE TIME PERMISSION VISIT PT OUTSIDE OF ROOM.
LAB NOTIFIED STAFF THEY ARE STARTING TO PREPARE THE PLATELETS FOR PT.

## 2020-04-18 NOTE — NUR
CBB GIVEN AND PT. TRANSFERRED OVER TO NEW BED WITH AIR MATTRESS. CALL LIGHT IS
IN REACH. WILL CONTINUE TO MONITOR.

## 2020-04-18 NOTE — NUR
PT INCONTINENT OF SOFT BROWN STOOL.  CLEANED PT AND REPLACED LINENS AS
NECESSARY.  OFFERED PT PAIN MED, REFUSED.  CALL BELL IN REACH.

## 2020-04-18 NOTE — NUR
PT SLEEPING. BREATHING MILDLY LABORED, EVEN. 94% ON 4L NC. CALLBELL W/IN
REACH. WILL CONTINUE TO MONITOR.

## 2020-04-18 NOTE — NUR
PT OPENED EYES APON RN ENTERING ROOM.  PLACED PILLOW UNDER HEELS, PT WITH NO
RESP COMPLAINT. NO NEEDS AT THIS TIME.  CALL BELL IN PLACE.

## 2020-04-18 NOTE — NUR
PT SLEEPING IN BED, NO S/S OF DISTRESS. BREATHING IS MILDLY LABORED, REGULAR.
EXPIRATORY WHEEZING THROUGHOUT ALL LOBES. PT APPEARS FRAGILE. HX OF LUNG
CANCER METS TO BONES. PT HAS NP COUGH, ON 3L NC. PT NPO, EXCEPT WITH PO MEDS.

## 2020-04-18 NOTE — NUR
PT CLEANED OF LARGE DAVE COLORED LOOSE STOOL. EMPTIED 150ML AN CLEAR URINE.
PT USED URINAL BYSELF. PT INCONTINENT OF STOOL.

## 2020-04-18 NOTE — NUR
PT CLEANED OF LARGE DAVE COLORED LOOSE STOOL. PARTIAL BATH & LINEN CHANGE
COMPLETED. PT HAS CALLBELL, TV REMOTE, EMESIS BAG, & TISSUES W/IN REACH.
MEDICATED FOR SOB

## 2020-04-18 NOTE — NUR
ASSESSMENT COMPLETED. IV SITES PATENT X2. PT. S-TACH ON THE MONITOR AND B/P
WNL. O2 INFUSING PER NC AS PER ORDER. PT. REPOSITIONED AT THIS TIME ONTO LEFT
SIDE WITH BLE ELEVATED ONTO PILLOW. UPDATED ON POC AND VERBALIZES
UNDERSTANDING. ENCOURAGED TO CALL FOR ANY NEEDS. CALL LIGHT IS IN REACH. WILL
CONTINUE TO MONITOR.

## 2020-04-18 NOTE — NUR
PT OPENED EYES WHEN ENTERING ROOM.  PAIN MED OFFERED, DECLINED.  SA02 96%  NO
NEEDS AT THIS TIME. CALL CARRIZALES IN REACH.

## 2020-04-19 VITALS — DIASTOLIC BLOOD PRESSURE: 56 MMHG | SYSTOLIC BLOOD PRESSURE: 97 MMHG

## 2020-04-19 VITALS — SYSTOLIC BLOOD PRESSURE: 100 MMHG | DIASTOLIC BLOOD PRESSURE: 72 MMHG

## 2020-04-19 VITALS — SYSTOLIC BLOOD PRESSURE: 104 MMHG | DIASTOLIC BLOOD PRESSURE: 61 MMHG

## 2020-04-19 VITALS — SYSTOLIC BLOOD PRESSURE: 78 MMHG | DIASTOLIC BLOOD PRESSURE: 54 MMHG

## 2020-04-19 VITALS — SYSTOLIC BLOOD PRESSURE: 95 MMHG | DIASTOLIC BLOOD PRESSURE: 58 MMHG

## 2020-04-19 VITALS — DIASTOLIC BLOOD PRESSURE: 63 MMHG | SYSTOLIC BLOOD PRESSURE: 109 MMHG

## 2020-04-19 VITALS — DIASTOLIC BLOOD PRESSURE: 59 MMHG | SYSTOLIC BLOOD PRESSURE: 107 MMHG

## 2020-04-19 LAB
ALBUMIN SERPL-MCNC: 2.1 G/DL (ref 3.2–5)
ALP SERPL-CCNC: 52 U/L (ref 38–126)
ANION GAP SERPL CALCULATED.3IONS-SCNC: 10 MMOL/L
AST SERPL-CCNC: 42 U/L (ref 19–48)
BUN SERPL-MCNC: 16 MG/DL (ref 8–23)
BUN/CREAT SERPL: 50
CHLORIDE SERPL-SCNC: 105 MMOL/L (ref 95–108)
CO2 SERPL-SCNC: 24 MMOL/L (ref 22–30)
CREAT SERPL-MCNC: 0.3 MG/DL (ref 0.7–1.3)
ERYTHROCYTE [DISTWIDTH] IN BLOOD BY AUTOMATED COUNT: 18.1 % (ref 11.5–15.5)
HCT VFR BLD AUTO: 37 % (ref 39–50)
HGB BLD-MCNC: 11.7 G/DL (ref 14–18)
MCH RBC QN AUTO: 27.8 PG  CALC (ref 26–32)
MCHC RBC AUTO-ENTMCNC: 31.6 G/DL CAL (ref 32–36)
MCV RBC AUTO: 87.9 FL  CALC (ref 80–100)
PLATELET # BLD AUTO: 54 THOU/UL (ref 130–400)
POTASSIUM SERPL-SCNC: 3.8 MMOL/L (ref 3.5–5.1)
PROT SERPL-MCNC: 4.6 G/DL (ref 6.3–8.2)
RBC # BLD AUTO: 4.21 MILL/UL (ref 4.7–6.1)
SODIUM SERPL-SCNC: 135 MMOL/L (ref 137–146)

## 2020-04-19 NOTE — NUR
PT. CLEANED OF A MODERATE DARK BROWN LOOSE STOOL AND JOJO CARE PROVIDED ALONG
WITH LINENS CHANGED. PT. SOB ON EXERTION AND PULLED UP INTO HIGH FOWLERS
POSITION. PRN INHALOR PROVIDED AS PER EMAR. SPO2 DOWN TO 87-88% ON 4LITERS/MIN
AND INCREASED TO 5LITERS/MIN AND SPO2 UP TO 92%. WILL CONTINUE TO MONITOR.
BLE ELEVATED ONTO PILLOW. EMS SITE REMOVED FROM LAC AND CATHETER TIP INTACT.
PT. TOLERATED WELL. PT. DENIES FURTHER NEEDS. CALL LIGHT IS IN REACH.

## 2020-04-19 NOTE — NUR
DR CUMMINGS NOTIFIED OF LOW BP, ORDERED BOLUS 250 ML OF CURRENT IVF, ALSO NOTIFIED
OF DARK BROWN/RED STOOLS.

## 2020-04-19 NOTE — NUR
IN AM LABS CHEMISTRY READS GLUCOSE AT 58. NOTIFIED DR. HURTADO (ER PHYSICIAN)
AND OF NPO STATUS ALONG WITH CURRENT ORDERS OF IVF INFUSING. NEW ORDERS
RECIEVED AND TO BE CARRIED OUT.

## 2020-04-19 NOTE — NUR
NOTIFIED [facility], SPOKE WITH THOM, RN, HOUSE SUP. PT ACCEPTED
TO PACU-ICU RM #1, FACE SHEET FAXED [phone]. REPORT TO BE CALLED TO
[phone]. ACCEPTING DR. KAM PEDRO.

## 2020-04-19 NOTE — NUR
REPORT GIVEN TO LAVELLE MCCALL @ [facility], TRANSPORTERS HERE AT THIS TIME
PREPARING PT TRANSPORT PT.
PT REMAINS ALERT AND ORIENTED AND UNDERSTANDS PLAN OF CARE.

## 2020-04-19 NOTE — NUR
Discharge instructions given. Patient verbalizes understanding of same.
Discharged [facility] good condition [facility]
with
*Other. All belongings sent with pt.
FAMILY INFORMED

## 2020-04-19 NOTE — NUR
DR CUMMINGS ROUNDED, DISCUSSED TRANSFER TO [facility] FOR BRONCHOSCOPY WITH
PT, PT STATED UNDERSTANDING, DAUGHTER ASHLEY NOTIFIED.

## 2020-04-19 NOTE — NUR
PT. CLEANED OF AN INCONTINENCE OF BM, JOJO CARE GIVEN AND NEW PAD AND GOWN
APPLIED. VSS. EXERTIONAL SOB NOTED R/T MOVEMENT AND PRN INHALOR PROVIDED AS
PER EMAR. REPOSITIONED. CALL LIGHT IS IN REACH. WILL CONTINUE TO MONITOR.

## 2020-04-19 NOTE — NUR
SHIFT CHANGE REPORT, PT SLEEPING BUT AWAKENED TO VERBAL STIMULI, ORIENTED, NO
C/O DISCOMFORT AT THIS TIME, BREATHING SHALLOW AND LABORED ON 5L O2 AND
USING ASSESSORY MUSCLES, IVF INFUSING, BED IN LOWEST POSITION AND CALL BELL IN
REACH.

## 2020-04-19 NOTE — NUR
PT. RESTING IN BED WITH NO DISTRESS NOTED; EASILY AROUSES AND REPOSITIONED AT
THIS TIME. URINAL EMPTIED. DENIES NEEDS. CALL LIGHT IS IN REACH.

## 2020-04-19 NOTE — NUR
PT. CLEANED OF A SMALL INCONTIENCE OF BM AND JOJO CARE PROVIDED; NEW PAD
APPLIED. REPOSITIONED ONTO LEFT SIDE WITH BLE ELEVATED. BS CHECKED POST AMP OF
D50 AND NOW GLUCOSE IS UP . CALL LIGHT IS IN REACH. WILL CONTINUE TO
MONITOR.

## 2020-04-20 NOTE — NUR
CALLED [facility] REGARDING PICKING UP THE AIR MATTRESS. SPOKE TO SHELLEY WAS
GIVEN CONFIRMATION NUMBER [id number].